# Patient Record
Sex: MALE | URBAN - METROPOLITAN AREA
[De-identification: names, ages, dates, MRNs, and addresses within clinical notes are randomized per-mention and may not be internally consistent; named-entity substitution may affect disease eponyms.]

---

## 2019-06-20 ENCOUNTER — TELEMEDICINE2 (OUTPATIENT)
Dept: VASCULAR LAB | Facility: MEDICAL CENTER | Age: 59
End: 2019-06-20
Attending: INTERNAL MEDICINE
Payer: OTHER GOVERNMENT

## 2019-06-20 DIAGNOSIS — R76.8 HEPATITIS B CORE ANTIBODY POSITIVE: ICD-10-CM

## 2019-06-20 DIAGNOSIS — B18.2 CHRONIC HEPATITIS C WITHOUT HEPATIC COMA (HCC): ICD-10-CM

## 2019-06-20 PROBLEM — I10 ESSENTIAL HYPERTENSION: Status: ACTIVE | Noted: 2019-06-20

## 2019-06-20 PROCEDURE — 99205 OFFICE O/P NEW HI 60 MIN: CPT | Performed by: INTERNAL MEDICINE

## 2019-06-20 RX ORDER — VELPATASVIR AND SOFOSBUVIR 100; 400 MG/1; MG/1
1 TABLET, FILM COATED ORAL DAILY
Qty: 84 TAB | Refills: 0 | Status: SHIPPED | OUTPATIENT
Start: 2019-06-20 | End: 2019-09-12

## 2019-06-20 NOTE — PROGRESS NOTES
[facility]     Date of Service: 6/19/2019    Referring Physician: JOSE MANUEL    Reason for Referral: Hepatitis C treatment    Chief Complaint: Hepatitis C    History of Present Illness:     Bethel Barajas is a 59 y.o. male with past medical history of hypertension, here for treatment of hepatitis C.  Patient states that he has had hepatitis C for a while, diagnosed a few days ago, unable to quantify time.  He believes that he may have contracted through IV drug use.  He does feel very fatigued.  He has never been treated in the past.    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 No pertinent surgical history.    Family history  No diabetes or cancer in family    Social History     Social History   • Marital status: Unknown     Spouse name: N/A   • Number of children: N/A   • Years of education: N/A     Occupational History   • Not on file.     Social History Main Topics   • Smoking status: Not on file   • Smokeless tobacco: Not on file   • Alcohol use Not on file   • Drug use: Unknown   • Sexual activity: Not on file     Other Topics Concern   • Not on file     Social History Narrative   • No narrative on file       Allergies   Allergen Reactions   • Penicillin G      Rash.  Several years ago.  Patient states that he tolerates amoxicillin        Medications:  Atenolol  Carvedilol  HCTZ  Indomethacin    Physical Exam:   This consultation was conducted utilizing secure and encrypted videoconferencing equipment with the assistance of a trained tele-presenter at the originating site.     Vital Signs: T 98.5 HR 96 RR 18 blood pressure 153/100 O2 97% weight 169 pounds height 5 feet 6 inches  Vital signs reviewed  Constitutional: Patient is oriented to person, place, and time. Appears well-developed and well-nourished. No distress  Head: Atraumatic, normocephalic  Eyes: Conjunctivae normal, EOM intact. Pupils are equal, round, and reactive to light.   Mouth/Throat: Lips without lesions, oropharynx is clear and moist.  Neck: Neck supple. No masses/lymphadenopathy  Cardiovascular: Normal rate, regular rhythm, normal S1S2 and intact distal pulses. No murmur, gallop, or friction rub. No pedal edema.  Pulmonary/Chest: No respiratory distress. Unlabored respiratory effort, fine crackles upper left lung fields  Abdominal: Soft, non tender. BS + x 4. No masses.  Firm, nontender liver edge palpable on deep inspiration  Musculoskeletal: No joint tenderness, swelling, erythema, or restriction of motion noted.  Neurological: Alert and oriented to person, place, and time. No gross cranial nerve deficit.   Skin: Skin is warm and dry. No rashes or embolic phenomena noted on exposed skin  Psychiatric: Normal mood and affect. Behavior is normal.     LABS:  No results found for: WBC, RBC, HEMOGLOBIN, HEMATOCRIT, MCV, MCH, MCHC, MPV  No results found for: SODIUM, POTASSIUM, CHLORIDE, CO2, GLUCOSE, BUN, CREATININE, BUNCREATRAT, GLOMRATE  No results found for: ALKPHOSPHAT, ASTSGOT, ALTSGPT, TBILIRUBIN   No results found for: CPKTOTAL     MICRO:  No results found for: BLOODCULTU, BLDCULT, BCHOLD      Latest pertinent labs were reviewed    IMAGING STUDIES:  Liver ultrasound as above    Assessment:   Bethel Barajas is a 59 y.o. male with a history of chronic hepatitis C, here for  treatment    Pertinent Diagnoses:    Plan:   Hepatitis C:  -Start PO Epclusa 1 tab daily x 12 weeks (ordered)  -Medication interactions: No significant medication interactions noted based on current data  -At 4 weeks from start of therapy: obtain eGFR, creatinine, hepatic function panel (note: A 10x increase in ALT at any time should prompt immediate discontinuation of therapy; if ALT rising then test every 2 weeks)   -HCV RNA quantitative PCR at 4 weeks from start of therapy, at completion of therapy, and at 12 weeks after completing therapy   -On all new labs and imaging send documentation once otained to [facility] and please notify me or Dr. Trujillo   -Patients with cirrhosis will need hepatocellular carcinoma surveillance every 6 months with imaging. Recommend enrolling in GI cirrhosis clinic    Isolated hepatitis B core IgG positive:  -Multiple interpretations possible  -Please repeat hepatitis B core antibody, surface antibody, surface antigen, hepatitis B e antibody (anti-HBe), serum hepatitis B quantitative DNA PCR    Return to clinic in 4 weeks    Sanya Linder M.D.    Please note that this dictation was created using voice recognition software. I have worked with technical experts from [facility] to optimize the interface.  I have made every reasonable attempt to correct obvious errors, but there may be errors of grammar and possibly content that I did not discover before finalizing the note.

## 2019-07-10 ENCOUNTER — DOCUMENTATION (OUTPATIENT)
Dept: INFECTIOUS DISEASES | Facility: MEDICAL CENTER | Age: 59
End: 2019-07-10

## 2019-07-10 NOTE — PROGRESS NOTES
Called by the physician at [facility] due to concern over mild elevation in AFP.  Unsure why lab was drawn as ultrasound is unremarkable and was not requested by Dr. Linder.  Confirmed that ultrasound is unremarkable.      --- Recommended initiating therapy with Epclusa as per note on 6/20 by Dr. Linder.       Sharmila Trujillo MD  Infectious Diseases

## 2019-07-23 ENCOUNTER — DOCUMENTATION (OUTPATIENT)
Dept: VASCULAR LAB | Facility: MEDICAL CENTER | Age: 59
End: 2019-07-23

## 2019-07-23 NOTE — PROGRESS NOTES
Reviewed labs from 7/15.    Hep C PCR 1040  No renal or LFT concerns    Has appointment with Dr. Trujillo 7/25/2019

## 2019-07-25 ENCOUNTER — TELEMEDICINE2 (OUTPATIENT)
Dept: VASCULAR LAB | Facility: MEDICAL CENTER | Age: 59
End: 2019-07-25
Attending: INTERNAL MEDICINE
Payer: OTHER GOVERNMENT

## 2019-07-25 DIAGNOSIS — R76.8 HEPATITIS B CORE ANTIBODY POSITIVE: ICD-10-CM

## 2019-07-25 DIAGNOSIS — B18.2 CHRONIC HEPATITIS C WITHOUT HEPATIC COMA (HCC): ICD-10-CM

## 2019-07-25 PROCEDURE — 99213 OFFICE O/P EST LOW 20 MIN: CPT | Performed by: INTERNAL MEDICINE

## 2019-07-25 NOTE — PROGRESS NOTES
"[]Omerver for attribution information  [facility] HEPATITIS C TELECONFERENCE CLINIC NOTE      Date of Service: 7/25/19      Referring Physician: JOSE MANUEL     Reason for Referral: Hepatitis C treatment     Chief Complaint: Hepatitis C     History of Present Illness:      Bethel aBrajas is a 59 y.o. male with past medical history of hypertension, here for treatment of hepatitis C.  Patient states that he has had hepatitis C for a while, diagnosed a few days ago, unable to quantify time.  He believes that he may have contracted through IV drug use.    He has never been treated in the past.    Patient started treatment for his hepatitis C with Epclusa on 7/11/2019.  He states that he has had no missed doses.  He is reporting no side effects or new symptoms associated with the Epclusa other than that he \"feels better\".     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 No pertinent surgical history.        Family history  No diabetes or cancer in family     Social History   Social History            Social History   • Marital status: Unknown       Spouse name: N/A   • Number of children: N/A   • Years of education: N/A          Occupational History   • Not on file.           Social History Main Topics   • Smoking status: Not on file   • Smokeless tobacco: Not on file   • Alcohol use " Not on file   • Drug use: Unknown   • Sexual activity: Not on file           Other Topics Concern   • Not on file          Social History Narrative   • No narrative on file                  Allergies   Allergen Reactions   • Penicillin G         Rash.  Several years ago.  Patient states that he tolerates amoxicillin         Medications:  Atenolol  Carvedilol  HCTZ  Indomethacin     Physical Exam:   This consultation was conducted utilizing secure and encrypted videoconferencing equipment with the assistance of a trained tele-presenter at the originating site.      Vital Signs: T 98.9, pulse 60, RR 18, 98%, /96 repeated and 158/93, weight 174  Vital signs reviewed  Constitutional: Patient is oriented to person, place, and time. Appears well-developed and well-nourished. No distress  Head: Atraumatic, normocephalic  Eyes: Conjunctivae normal, EOM intact. Pupils are equal, round, and reactive to light.   Mouth/Throat: Lips without lesions, oropharynx is clear and moist.  Neck: Neck supple. No masses/lymphadenopathy  Cardiovascular: Normal rate, regular rhythm, normal S1S2 and intact distal pulses. No murmur, gallop, or friction rub. No pedal edema.  Pulmonary/Chest: No respiratory distress. Unlabored respiratory effort, fine crackles upper left lung fields  Abdominal: Soft, non tender. BS + x 4. No masses.  Firm, nontender liver edge palpable on deep inspiration  Musculoskeletal: No joint tenderness, swelling, erythema, or restriction of motion noted.  Neurological: Alert and oriented to person, place, and time. No gross cranial nerve deficit.   Skin: Skin is warm and dry. No rashes or embolic phenomena noted on exposed skin  Psychiatric: Normal mood and affect. Behavior is normal.      LABS:    Labs from 4/4/2019  HBV surface antibody negative, repeat on 7/8 again negative, surface antigen negative, repeat on 7/8, negative core antibody positive also positive on repeat on 7/8- NOT IMMUNE  HPV DNA negative 7/8/19    HAV antibody negative on 7/8 - NOT IMMUNE    WBC 4.3, hemoglobin 14.9, platelets 223, creatinine 0.99, AST 71, ALT 56, alk phos 22, T bili 0.6       IMAGING STUDIES:  Liver ultrasound as above     Assessment:   Bethel Barajas is a 59 y.o. male with a history of chronic hepatitis C, here for treatment.  Treatment was recommended and he was started on Epclusa on 7/11/2019.  He is tolerating well and reports he is feeling better.        Plan:     Hepatitis C:  -Continue Epclusa 1 tab daily x 12 weeks (ordered)    -Medication interactions: No significant medication interactions noted based on current data    --- 4 weeks from start of therapy: obtain eGFR, creatinine, hepatic function panel (note: A 10x increase in ALT at any time should prompt immediate DC of therapy, if ALT rising then test q2 weeks)   --- HCV RNA quant at 4 weeks from start of therapy and 12 weeks after completing therapy ([id number])  --- On all new labs and imaging [facility] and notify me.   --- Patient with cirrhosis so will need liver US or other dedicated imaging every 6 months       Vaccines   HAV, HBV, Pneumonia (PCV13 -> at least 8 weeks -> PPSV23), Tdap, influenza       Sharmila Trujillo M.D.                               -At 4 weeks from start of therapy: obtain eGFR, creatinine, hepatic function panel (note: A 10x increase in ALT at any time should prompt immediate discontinuation of therapy; if ALT rising then test every 2 weeks)   -HCV RNA quantitative PCR at 4 weeks from start of therapy, at completion of therapy, and at 12 weeks after completing therapy   -On all new labs and imaging send documentation [facility] and please notify me or Dr. Trujillo   -Patients with cirrhosis will need hepatocellular carcinoma surveillance every 6 months with imaging. Recommend enrolling in GI cirrhosis clinic     Isolated hepatitis B core IgG positive:  -Multiple interpretations possible  -Please repeat  hepatitis B core antibody, surface antibody, surface antigen, hepatitis B e antibody (anti-HBe), serum hepatitis B quantitative DNA PCR     Return to clinic in 4 weeks

## 2019-10-20 NOTE — PROGRESS NOTES
[facility] HEPATITIS C TELECONFERENCE CLINIC NOTE     Date of Service: 10/21/2019    Referring Physician: JOSE MANUEL    Chief Complaint: Follow-up for hepatitis C    History of Present Illness:     Bethel Barajas is a 59 y.o. male with past medical history of hypertension, here for treatment of hepatitis C.  Patient states that he has had hepatitis C for a while, diagnosed a few days prior, unable to quantify time.  He believes that he may have contracted through IV drug use.    He has never been treated in the past.     Patient started treatment for his hepatitis C with Epclusa on 7/11/2019.  He states that he has had no missed doses.  He is reporting no side effects or new symptoms associated with the Epclusa.    Patient completed Epclusa on 10/2/2019.  On 8/14, patient had no elevated transaminases, and his HCV quantitative PCR was 30.    HCV PCR at end of therapy not available.    Patient states feeling much better after treatment.  No new complaints.     HCV genotype: 1a  HCV resistance: Not available  Prior treatment status: Naïve  Risk factors: IV drug use  Cirrhosis: FibroSure 0.93, F4 cirrhosis  CTP score: Class A  APRI score: 1.598  HCV PCR at start of treatment 2,390,000 in April 2019  HBV serologies: Isolated core IgG positive  HAV serology: Nonimmune  HIV Ab: Nonreactive April 2019  Imaging: Unremarkable ultrasound on May 2019    Review of Systems:  All other systems reviewed and are negative expect as noted in HPI    Past Medical History:   Diagnosis Date   • Chronic hepatitis C without hepatic coma (HCC) 6/20/2019   • Essential hypertension 6/20/2019       No past surgical history on file.    Family history  Reviewed and not pertinent.      Social History     Socioeconomic History   • Marital status: Unknown     Spouse name: Not on file   • Number of children: Not on file   • Years of education: Not on file   • Highest education level: Not on file   Occupational History   • Not on file   Social Needs   •  "Financial resource strain: Not on file   • Food insecurity:     Worry: Not on file     Inability: Not on file   • Transportation needs:     Medical: Not on file     Non-medical: Not on file   Tobacco Use   • Smoking status: Never Smoker   Substance and Sexual Activity   • Alcohol use: Not on file   • Drug use: Not on file   • Sexual activity: Not on file   Lifestyle   • Physical activity:     Days per week: Not on file     Minutes per session: Not on file   • Stress: Not on file   Relationships   • Social connections:     Talks on phone: Not on file     Gets together: Not on file     Attends Sikhism service: Not on file     Active member of club or organization: Not on file     Attends meetings of clubs or organizations: Not on file     Relationship status: Not on file   • Intimate partner violence:     Fear of current or ex partner: Not on file     Emotionally abused: Not on file     Physically abused: Not on file     Forced sexual activity: Not on file   Other Topics Concern   • Not on file   Social History Narrative   • Not on file       Allergies   Allergen Reactions   • Penicillin G      Rash.  Several years ago.  Patient states that he tolerates amoxicillin       Medications:  No current outpatient medications on file prior to visit.     No current facility-administered medications on file prior to visit.        Physical Exam:   This consultation was conducted utilizing secure and encrypted videoconferencing equipment with the assistance of a trained tele-presenter at the originating site.   Vital Signs:  174 lbs Ht 5' 9\" HR 59 RR 16 /87 o2 98%  Vital signs reviewed  Constitutional: Patient is oriented to person, place, and time. Appears well-developed and well-nourished. No distress  Head: Atraumatic, normocephalic  Eyes: Conjunctivae normal, EOM intact. Pupils are equal, round, and reactive to light.   Mouth/Throat: Lips without lesions, oropharynx is clear and moist.  Neck: Neck supple. No " masses/lymphadenopathy  Cardiovascular: Normal rate, regular rhythm, normal S1S2 and intact distal pulses. No murmur, gallop, or friction rub. No pedal edema.  Pulmonary/Chest: No respiratory distress. Unlabored respiratory effort, lungs clear to auscultation. No wheezes or rales.   Abdominal: Soft, non tender. BS + x 4. No masses or hepatosplenomegaly.   Musculoskeletal: No joint tenderness, swelling, erythema, or restriction of motion noted.  Neurological: Alert and oriented to person, place, and time. No gross cranial nerve deficit.Skin: Skin is warm and dry. No rashes or embolic phenomena noted on exposed skin  Psychiatric: Pleasant.  Normal mood and affect. Behavior is normal.     LABS:  No results found for: WBC, RBC, HEMOGLOBIN, HEMATOCRIT, MCV, MCH, MCHC, MPV  No results found for: SODIUM, POTASSIUM, CHLORIDE, CO2, GLUCOSE, BUN, CREATININE, BUNCREATRAT, GLOMRATE  No results found for: ALKPHOSPHAT, ASTSGOT, ALTSGPT, TBILIRUBIN   No results found for: CPKTOTAL     MICRO:  No results found for: BLOODCULTU, BLDCULT, BCHOLD      Latest pertinent labs were reviewed    Assessment:   Bethel Barajas is a 59 y.o. male with a history of chronic hepatitis C, completed therapy with Epclusa    Pertinent Diagnoses:  HCV  Compensated cirrhosis    Plan:   Hepatitis C:  -Please check HCV quantitative PCR now  -Please recheck HCV quantitative PCR 12 weeks after completion of therapy which would be around 12/24/2019  -Patient has cirrhosis so will need to be involved in GI cirrhosis clinic for serial imaging    Does not need follow-up in our clinic but please send us the above labs as requested    Sanya Linder M.D.    Please note that this dictation was created using voice recognition software. I have worked with technical experts from [employer] to optimize the interface.  I have made every reasonable attempt to correct obvious errors, but there may be errors of grammar and possibly content that I did not discover  before finalizing the note.

## 2019-10-21 ENCOUNTER — TELEMEDICINE2 (OUTPATIENT)
Dept: VASCULAR LAB | Facility: MEDICAL CENTER | Age: 59
End: 2019-10-21
Attending: INTERNAL MEDICINE
Payer: OTHER GOVERNMENT

## 2019-10-21 DIAGNOSIS — B18.2 CHRONIC HEPATITIS C WITHOUT HEPATIC COMA (HCC): ICD-10-CM

## 2019-10-21 DIAGNOSIS — K74.69 COMPENSATED HCV CIRRHOSIS (HCC): ICD-10-CM

## 2019-10-21 DIAGNOSIS — R76.8 HEPATITIS B CORE ANTIBODY POSITIVE: ICD-10-CM

## 2019-10-21 DIAGNOSIS — B19.20 COMPENSATED HCV CIRRHOSIS (HCC): ICD-10-CM

## 2019-10-21 DIAGNOSIS — I10 ESSENTIAL HYPERTENSION: ICD-10-CM

## 2019-10-21 PROCEDURE — 99212 OFFICE O/P EST SF 10 MIN: CPT | Performed by: INTERNAL MEDICINE

## 2019-12-17 ENCOUNTER — DOCUMENTATION (OUTPATIENT)
Dept: INFECTIOUS DISEASES | Facility: MEDICAL CENTER | Age: 59
End: 2019-12-17

## 2019-12-18 NOTE — PROGRESS NOTES
Reviewed labs and as of 11/21/19 the HCV PCR is not detected.  AS per last ID note:     Hepatitis C:    -Please recheck HCV quantitative PCR 12 weeks after completion of therapy which would be around 12/24/2019  -Patient has cirrhosis so will need to be involved in GI cirrhosis clinic for serial imaging     Does not need follow-up in our clinic but please send us the above labs as requested.       Sharmila Trujillo MD  Infectious Diseases